# Patient Record
Sex: FEMALE | Race: WHITE | NOT HISPANIC OR LATINO | Employment: FULL TIME | URBAN - METROPOLITAN AREA
[De-identification: names, ages, dates, MRNs, and addresses within clinical notes are randomized per-mention and may not be internally consistent; named-entity substitution may affect disease eponyms.]

---

## 2023-03-06 ENCOUNTER — SPECIALTY PHARMACY (OUTPATIENT)
Dept: PHARMACY | Facility: CLINIC | Age: 53
End: 2023-03-06

## 2023-03-06 NOTE — TELEPHONE ENCOUNTER
Order for Humira. PA required. PA submitted via [facility] website. Key: [id number] - PA Case ID: [id number]

## 2023-03-08 NOTE — TELEPHONE ENCOUNTER
Humira PA approved. CaseId:[id number];Status:Approved;Review Type:Prior Auth;Coverage Start Date:02/04/2023;Coverage End Date:12/31/2099;  [employer]. [facility] is out of network. Pt must fill at [facility]. [phone]. Pt informed of the approval and the requirement to use [facility]. Pt provided with contact information for [facility]. Rx routed to [facility].